# Patient Record
Sex: MALE | Race: OTHER | NOT HISPANIC OR LATINO | URBAN - METROPOLITAN AREA
[De-identification: names, ages, dates, MRNs, and addresses within clinical notes are randomized per-mention and may not be internally consistent; named-entity substitution may affect disease eponyms.]

---

## 2019-09-24 ENCOUNTER — EMERGENCY (EMERGENCY)
Facility: HOSPITAL | Age: 44
LOS: 1 days | Discharge: HOME | End: 2019-09-24
Attending: EMERGENCY MEDICINE
Payer: SELF-PAY

## 2019-09-24 VITALS
WEIGHT: 177.91 LBS | OXYGEN SATURATION: 98 % | TEMPERATURE: 99 F | HEIGHT: 68 IN | RESPIRATION RATE: 20 BRPM | DIASTOLIC BLOOD PRESSURE: 80 MMHG | SYSTOLIC BLOOD PRESSURE: 140 MMHG | HEART RATE: 89 BPM

## 2019-09-24 PROCEDURE — 99284 EMERGENCY DEPT VISIT MOD MDM: CPT

## 2019-09-24 PROCEDURE — 99283 EMERGENCY DEPT VISIT LOW MDM: CPT

## 2019-09-24 RX ORDER — FLUTICASONE PROPIONATE 50 MCG
2 SPRAY, SUSPENSION NASAL
Qty: 1 | Refills: 0
Start: 2019-09-24 | End: 2019-10-23

## 2019-09-24 NOTE — ED ADULT TRIAGE NOTE - CHIEF COMPLAINT QUOTE
vertigo s/s with dizzy sensation, had some beer last night. Sinus congestion, throat pain. tingling in hands x 2 weeks

## 2019-09-24 NOTE — ED STATDOCS - CLINICAL SUMMARY MEDICAL DECISION MAKING FREE TEXT BOX
44 Y M P/W intermittent frontal sinus likely chronic sinusitis in the setting of pain worsening with altitude. Treat with Levoquine and prednisone. 44 Y M P/W intermittent frontal sinus likely chronic sinusitis in the setting of pain worsening with altitude. Treat with Levoquin, medrol and flonase   advised less etoh and cigarettes and ENT fu when he gets back home to [place].

## 2019-09-24 NOTE — ED STATDOCS - PHYSICAL EXAMINATION
PHYSICAL EXAM:  GENERAL: NAD, well-developed  HEAD:  Atraumatic, Normocephalic, no tenderness to palpation across frontal or maxillary sinuses, nares intact, nonerythematous or boggy.  EYES: EOMI, PERRLA, conjunctiva and sclera clear  NECK: Supple, No JVD  CHEST/LUNG: Clear to auscultation bilaterally; No wheeze  HEART: Regular rate and rhythm; No murmurs, rubs, or gallops  ABDOMEN: Soft, Nontender, Nondistended; Bowel sounds present  EXTREMITIES:  2+ Peripheral Pulses, No clubbing, cyanosis, or edema  PSYCH: AAOx3  NEUROLOGY: CN II-XII intact, Sensation and motor intact X4 peripheral limbs, no dysdiadochokinesia or dysmetria, no pronation.  SKIN: No rashes or lesions

## 2019-09-24 NOTE — ED STATDOCS - ATTENDING CONTRIBUTION TO CARE
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

## 2019-09-24 NOTE — ED STATDOCS - NS ED ROS FT
REVIEW OF SYSTEMS:    CONSTITUTIONAL: No weakness, fevers or chills  EYES/ENT: No visual changes;  No vertigo or throat pain   NECK: No pain or stiffness  RESPIRATORY: No cough, wheezing, hemoptysis; No shortness of breath  CARDIOVASCULAR: No chest pain or palpitations  GASTROINTESTINAL: No abdominal or epigastric pain. No nausea, vomiting, or hematemesis; No diarrhea or constipation. No melena or hematochezia.  GENITOURINARY: No dysuria, frequency or hematuria  NEUROLOGICAL: No weakness, paresthesia,   SKIN: No itching, rashes

## 2019-09-24 NOTE — ED STATDOCS - OBJECTIVE STATEMENT
44 Y M H/O HTN, ETOH use (8-9 beers every other day), smoking, P/W 3 weeks of sinus congestion.  3 weeks ago patient experienced frontal pressure/pain during flight from [place], describes difficulty concentrating due to diffuse pain. Pain has been intermittent associated with flights (5 flights in the past 2 weeks to and from [place] and [place]). Denies vertigo, ataxia, nausea, vomiting, photophobia.

## 2019-09-24 NOTE — ED STATDOCS - PATIENT PORTAL LINK FT
You can access the FollowMyHealth Patient Portal offered by [facility] by registering at the following website: [email or web address]. By joining [employer]’s FollowMyHealth portal, you will also be able to view your health information using other applications (apps) compatible with our system.